# Patient Record
Sex: MALE
[De-identification: names, ages, dates, MRNs, and addresses within clinical notes are randomized per-mention and may not be internally consistent; named-entity substitution may affect disease eponyms.]

---

## 2020-12-29 ENCOUNTER — HOSPITAL ENCOUNTER (EMERGENCY)
Dept: HOSPITAL 39 - ER | Age: 45
Discharge: ANOTHER HOSPITAL | End: 2020-12-29
Payer: MEDICARE

## 2020-12-29 VITALS — SYSTOLIC BLOOD PRESSURE: 188 MMHG | TEMPERATURE: 97.5 F | OXYGEN SATURATION: 97 % | DIASTOLIC BLOOD PRESSURE: 79 MMHG

## 2020-12-29 DIAGNOSIS — Z88.5: ICD-10-CM

## 2020-12-29 DIAGNOSIS — Z86.73: ICD-10-CM

## 2020-12-29 DIAGNOSIS — S02.2XXA: ICD-10-CM

## 2020-12-29 DIAGNOSIS — E11.22: ICD-10-CM

## 2020-12-29 DIAGNOSIS — N18.6: ICD-10-CM

## 2020-12-29 DIAGNOSIS — Z79.899: ICD-10-CM

## 2020-12-29 DIAGNOSIS — I12.0: ICD-10-CM

## 2020-12-29 DIAGNOSIS — H54.62: ICD-10-CM

## 2020-12-29 DIAGNOSIS — Z99.2: ICD-10-CM

## 2020-12-29 DIAGNOSIS — Z79.4: ICD-10-CM

## 2020-12-29 DIAGNOSIS — I51.9: ICD-10-CM

## 2020-12-29 DIAGNOSIS — Z79.82: ICD-10-CM

## 2020-12-29 DIAGNOSIS — S02.831A: ICD-10-CM

## 2020-12-29 DIAGNOSIS — S00.03XA: ICD-10-CM

## 2020-12-29 DIAGNOSIS — S02.31XA: Primary | ICD-10-CM
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
## 2020-12-29 PROCEDURE — 85025 COMPLETE CBC W/AUTO DIFF WBC: CPT

## 2020-12-29 PROCEDURE — 70450 CT HEAD/BRAIN W/O DYE: CPT

## 2020-12-29 PROCEDURE — 36415 COLL VENOUS BLD VENIPUNCTURE: CPT

## 2020-12-29 PROCEDURE — 72125 CT NECK SPINE W/O DYE: CPT

## 2020-12-29 PROCEDURE — 70480 CT ORBIT/EAR/FOSSA W/O DYE: CPT

## 2020-12-29 PROCEDURE — 80048 BASIC METABOLIC PNL TOTAL CA: CPT

## 2020-12-29 NOTE — CT
EXAM DESCRIPTION: Head (accession [id number]), Orbits

(accession [id number])



CLINICAL HISTORY: 45 years Male, fall with facial trauma and

headache



COMPARISON: None.



TECHNIQUE:  Axial images obtained from the skull base to the

vertex without intravenous contrast with images.  Coronal and

sagittal reformations provided.  This exam was performed

according to our departmental dose-optimization program, which

includes automated exposure control, adjustment of the mA and/or

kV according to patient size and/or use of iterative

reconstruction technique.



Time Last Seen Well (If known) for Code Stroke:  n/a



FINDINGS:

Brain Parenchyma, ventricles, meninges, and extra-axial spaces:

Mild generalized cerebral atrophy.  Normal attenuation of brain

parenchyma.  No acute cranial hemorrhage. Serpiginous

hyperdensity in the posterior lateral left occipital lobe that

may represent calcification from previous infection or trauma. 

No abnormal extra-axial fluid collection.



Vascular: Normal.



Calvarium, paranasal sinuses, mastoids, and orbits: Small

posterior scalp hematoma without underlying fracture. Severe

periorbital soft tissue swelling and laceration. Acute depressed

fractures of the medial right orbital wall/lamina papyracea.

Acute minimally depressed right nasal bone fracture. Acute

displaced fracture of the right orbital floor but without

herniation of the inferior rectus muscle. Fluid and blood

products completely opacified the right maxillary sinus. There is

fluid likely blood product within the right ethmoid sinus air

cells as well trace mucosal thickening of the left maxillary

posterior wall. Remaining paranasal sinuses are clear. Small

right mastoid tip effusion. There is mild proptosis of the right

orbit when compared to the left.



IMPRESSION: 

1. No acute intracranial abnormality.

2. Small posterior scalp hematoma without underlying fracture.

3. Severe right periorbital soft tissue swelling laceration with

overlying fractures involving the medial right orbital wall and

orbital floor.

4. Complete opacification from fluid and blood products within

the right maxillary sinus. Blood products also within the right

ethmoid sinus.

5. Mild proptosis of the right orbit when compared to left. There

is also edematous enlargement of the right inferior rectus muscle

which probably relates to trauma. Inferior rectus intramuscular

hematoma is also likely although is without herniation through

the orbital floor fracture.

6. Small right mastoid tip effusion.

7. Minimally depressed right nasal bone fracture.



Electronically signed by:  Nahun Tam MD  12/29/2020 11:36 AM [facility]

Workstation: [id number]

## 2020-12-29 NOTE — CT
EXAM DESCRIPTION: Cervical Spine



CLINICAL HISTORY: 45 years Male, fall with c/o neck pain



COMPARISON: None.



TECHNIQUE: Axial CT images were obtained through the cervical

spine without contrast. Sagittal and coronal reformations

provided.  This exam was performed according to our departmental

dose-optimization program, which includes automated exposure

control, adjustment of the mA and/or kV according to patient size

and/or use of iterative reconstruction technique.



FINDINGS: 

Vertebrae and facet joints:  No acute fracture. No acute

compression deformity. Straightening the cervical spine. Normal

AP alignment. Slight dextro curvature of the cervical spine

without lateral translation and may be positional.  Multilevel

mild facet arthropathy.



Disc spaces, spinal canal and neuroforamina:  This space heights

are maintained.  



No significant disc osteophyte complex. Spinal canal patent.

Multilevel mild neural foraminal stenosis.



Paraspinous soft-tissues: Unremarkable paravertebral soft

tissues. Moderate atherosclerosis.  



IMPRESSION: 

1. No acute fracture or subluxation.



Electronically signed by:  Nahun Tam MD  12/29/2020 11:39 AM [facility]

Workstation: [id number]

## 2020-12-29 NOTE — CT
EXAM DESCRIPTION: Head (accession [id number]), Orbits

(accession [id number])



CLINICAL HISTORY: 45 years Male, fall with facial trauma and

headache



COMPARISON: None.



TECHNIQUE:  Axial images obtained from the skull base to the

vertex without intravenous contrast with images.  Coronal and

sagittal reformations provided.  This exam was performed

according to our departmental dose-optimization program, which

includes automated exposure control, adjustment of the mA and/or

kV according to patient size and/or use of iterative

reconstruction technique.



Time Last Seen Well (If known) for Code Stroke:  n/a



FINDINGS:

Brain Parenchyma, ventricles, meninges, and extra-axial spaces:

Mild generalized cerebral atrophy.  Normal attenuation of brain

parenchyma.  No acute cranial hemorrhage. Serpiginous

hyperdensity in the posterior lateral left occipital lobe that

may represent calcification from previous infection or trauma. 

No abnormal extra-axial fluid collection.



Vascular: Normal.



Calvarium, paranasal sinuses, mastoids, and orbits: Small

posterior scalp hematoma without underlying fracture. Severe

periorbital soft tissue swelling and laceration. Acute depressed

fractures of the medial right orbital wall/lamina papyracea.

Acute minimally depressed right nasal bone fracture. Acute

displaced fracture of the right orbital floor but without

herniation of the inferior rectus muscle. Fluid and blood

products completely opacified the right maxillary sinus. There is

fluid likely blood product within the right ethmoid sinus air

cells as well trace mucosal thickening of the left maxillary

posterior wall. Remaining paranasal sinuses are clear. Small

right mastoid tip effusion. There is mild proptosis of the right

orbit when compared to the left.



IMPRESSION: 

1. No acute intracranial abnormality.

2. Small posterior scalp hematoma without underlying fracture.

3. Severe right periorbital soft tissue swelling laceration with

overlying fractures involving the medial right orbital wall and

orbital floor.

4. Complete opacification from fluid and blood products within

the right maxillary sinus. Blood products also within the right

ethmoid sinus.

5. Mild proptosis of the right orbit when compared to left. There

is also edematous enlargement of the right inferior rectus muscle

which probably relates to trauma. Inferior rectus intramuscular

hematoma is also likely although is without herniation through

the orbital floor fracture.

6. Small right mastoid tip effusion.

7. Minimally depressed right nasal bone fracture.



Electronically signed by:  Nahun Tam MD  12/29/2020 11:36 AM [facility]

Workstation: [id number]

## 2020-12-29 NOTE — ED.PDOC
History of Present Illness





- General


Chief Complaint: Trauma


Stated Complaint: s/p fall,facial swelling


Time Seen by Provider: 12/29/20 09:06


Source: patient, RN notes reviewed, Vital Signs reviewed, EMS notes reviewed


Exam Limitations: no limitations





- History of Present Illness


Initial Comments: 





Patient is a 45-year-old  male who presents from the dialysis unit 

status post trip and fall with complaints of right eye swelling and pain, 

headache and neck pain.  Patient has a past medical history of blindness in the 

left eye and he is only able to see light and dark.  Patient complains of 

swelling to the right periorbital area, headache and neck pain.  The pain is 

throbbing in nature.  It is worse with movement.  Nothing improves the pain.  

The pain is constant.  It does not radiate.  Patient denies any other symptoms.


Occurred: just prior to arrival


Severity: moderate


Pain Location: head, face, neck


Method of Injury: fall


Improving Factors: nothing


Worsening Factors: movement


Loss of Consciousness: no loss of consciousness


Associated Symptoms (Fall): headache, neck pain


Allergies/Adverse Reactions: 


Allergies





Hydrocodone Allergy (Verified 12/29/20 09:32)


   





Home Medications: 


Ambulatory Orders





Amlodipine Besylate 10 mg PO BEDTIME 12/29/20 


Aspirin [Aspirin Adult Low Dose] 81 mg PO DAILY 12/29/20 


Atorvastatin Calcium [Lipitor] 20 mg PO BEDTIME 12/29/20 


Carvedilol 25 mg PO BID 12/29/20 


Gabapentin 300 mg PO BEDTIME 12/29/20 


Human Insulin Aspart [Novolog] 5 unit SC TIDFD 12/29/20 


Insulin Glargine [Basaglar Kwikpen] 25 unit SC .EVENING 12/29/20 


Losartan Potassium 100 mg PO DAILY 12/29/20 


Minoxidil 5 mg PO BID 12/29/20 


Sevelamer Carbonate [Renvela] 1,600 mg PO TID 12/29/20 


Sucroferric Oxyhydroxide [Velphoro] 500 mg PO TID 12/29/20 


Valsartan [Diovan] 320 mg PO DAILY 12/29/20 











Review of Systems





- Review of Systems


Constitutional: States: no symptoms reported, see HPI.  Denies: chills, fever, 

malaise, weakness


EENTM: States: see HPI, eye pain


Respiratory: Denies: cough, short of breath, stridor, wheezing


Cardiology: States: no symptoms reported.  Denies: chest pain, palpitations, 

syncope


Gastrointestinal/Abdominal: States: no symptoms reported.  Denies: abdominal 

pain, diarrhea, nausea, vomiting


Musculoskeletal: States: see HPI, neck pain.  Denies: back pain, joint pain


Skin: States: change in color - right periorbital bruising.  Denies: rash


Neurological: States: see HPI, headache, pre-existing deficit - left eye 

blindness.  Denies: tingling, tremors, weakness


Endocrine: States: no symptoms reported.  Denies: increased hunger, increased 

thirst, increased urine


Hematologic/Lymphatic: States: easy bleeding - Pt is on Plavix.  Denies: blood 

clots


All other Systems: No Change from Baseline





Past Medical History (General)





- Patient Medical History


Hx Stroke: Yes


Hx Cardiac Disorders: Yes


Hx Congestive Heart Failure: No


Hx Hypertension: Yes


Hx Diabetes: Yes


Hx Renal Disease: Yes





- Vaccination History


Hx Influenza Vaccination: Yes


Hx Pneumococcal Vaccination: Yes





- Social History


Hx Tobacco Use: Yes





Family Medical History





- Family History


  ** Father


Family History: Unknown


Living Status: Unknown





Physical Exam





- Physical Exam


General Appearance: Alert, Anxious, Obvious distress, Unkempt, Well Developed, 

Well Nourished


Head Injury: contusions - right periorbital area, ecchymosis - right periorbital

 area, swelling - right periorbital area


Eye Exam: right other - chemosis, sluggish pupilary response, left abnormal 

pupil - pin point without accomadation to light


ENT Exam: hearing grossly normal, no dental injury, other


Neck Exam: limited range of motion - secondary pain, painful range of motion, 

paraspinous muscle tender, spinous processes tender


Cardiovascular/Respiratory: regular rate, rhythm, no M/R/G, normal peripheral 

pulses, no JVD, normal breath sounds, no respiratory distress


Gastrointestinal/Abdominal: normal bowel sounds, non tender, soft, distended - 

obese


Back Exam: normal inspection, no CVA tenderness, no vertebral tenderness


Extremity Exam: no evidence of injury, normal range of motion, non-tender, no 

pedal edema


Neurologic: CNs II-XII nml as tested, no motor/sensory deficits, alert, normal 

mood/affect, oriented x 3


Skin Exam: warm/dry





- [place]ises Coma Score


Best Eye Response (Moises): (4) open spontaneously


Best Verbal Response ([place]): (5) oriented


Best Motor Response (Moises): (6) obeys commands





Progress





- Progress


Progress: 


Differential diagnosis: Orbital wall fracture, cranial fracture, intracranial 

bleed, retrobulbar hematoma among others.








12/29/20 11:30


Still awaiting reading for the CT scans.  Remainder of the labs are unremarkable

 except for the renal failure which is expected as this is a chronic dialysis 

patient.  Patient's pain is improved after pain medications.








12/29/20 12:15


CT scan results reviewed with the patient and his wife.  Concern for continued 

bleeding retro and inferior ocularly.  Have recommended transfer to the trauma 

center for further evaluation.  Patient was accepted by Dr. Glynn at [facility] trauma

 services.  I discussed the plan of care with the patient and his wife and they 

voiced understanding and agreement with the plan of care at this time.  We are 

arranging transport now.





Peter Westbrook m.D.


#[phone]

















- Results/Orders


Results/Orders: 





                                        





12/29/20 09:06


Elevate:Head .PRN 








                         Laboratory Results - last 24 hr











  12/29/20 12/29/20





  09:16 09:16


 


WBC  6.0 


 


RBC  2.90 L 


 


Hgb  8.8 L 


 


Hct  26.4 L 


 


MCV  90.9 


 


MCH  30.5 


 


MCHC  33.5 


 


RDW  16.9 H 


 


Plt Count  82 L 


 


MPV  9.5 


 


Absolute Neuts (auto)  4.40 


 


Absolute Lymphs (auto)  0.90 L 


 


Absolute Monos (auto)  0.40 


 


Absolute Eos (auto)  0.30 


 


Absolute Basos (auto)  0.00 


 


Neutrophils %  73.6 


 


Lymphocytes %  14.9 L 


 


Monocytes %  6.1 


 


Eosinophils %  4.7 


 


Basophils %  0.7 


 


Sodium   136


 


Potassium   3.5 L


 


Chloride   92 L


 


Carbon Dioxide   30


 


Anion Gap   17.5


 


BUN   33 H


 


Creatinine   5.89 H


 


BUN/Creatinine Ratio   5.6 L


 


Random Glucose   104


 


Serum Osmolality   279.5


 


Calcium   8.6








EXAM DESCRIPTION: Head (accession [id number]), Orbits (accession 

[id number])  CLINICAL HISTORY: 45 years Male, fall with facial trauma and 

headache  COMPARISON: None.  TECHNIQUE: Axial images obtained from the skull 

base to the vertex without intravenous contrast with images. Coronal and 

sagittal reformations provided. This exam was performed according to our 

departmental dose-optimization program, which includes automated exposure 

control, adjustment of the mA and/or kV according to patient size and/or use of 

iterative reconstruction technique.  Time Last Seen Well (If known) for Code 

Stroke: n/a  FINDINGS: Brain Parenchyma, ventricles, meninges, and extra-axial 

spaces: Mild generalized cerebral atrophy. Normal attenuation of brain 

parenchyma. No acute cranial hemorrhage. Serpiginous hyperdensity in the 

posterior lateral left occipital lobe that may represent calcification from 

previous infection or trauma. No abnormal extra-axial fluid collection.  

Vascular: Normal.  Calvarium, paranasal sinuses, mastoids, and orbits: Small 

posterior scalp hematoma without underlying fracture. Severe periorbital soft 

tissue swelling and laceration. Acute depressed fractures of the medial right 

orbital wall/lamina papyracea. Acute minimally depressed right nasal bone 

fracture. Acute displaced fracture of the right orbital floor but without 

herniation of the inferior rectus muscle. Fluid and blood products completely 

opacified the right maxillary sinus. There is fluid likely blood product within 

the right ethmoid sinus air cells as well trace mucosal thickening of the left 

maxillary posterior wall. Remaining paranasal sinuses are clear. Small right 

mastoid tip effusion. There is mild proptosis of the right orbit when compared 

to the left.  IMPRESSION: 1. No acute intracranial abnormality. 2. Small 

posterior scalp hematoma without underlying fracture. 3. Severe right 

periorbital soft tissue swelling laceration with overlying fractures involving 

the medial right orbital wall and orbital floor. 4. Complete opacification from 

fluid and blood products within the right maxillary sinus. Blood products also 

within the right ethmoid sinus. 5. Mild proptosis of the right orbit when 

compared to left. There is also edematous enlargement of the right inferior 

rectus muscle which probably relates to trauma. Inferior rectus intramuscular 

hematoma is also likely although is without herniation through the orbital floor

 fracture. 6. Small right mastoid tip effusion. 7. Minimally depressed right 

nasal bone fracture.  Electronically signed by: Nahun Tam MD 12/29/2020 11:36 

AM CST





EXAM DESCRIPTION: Cervical Spine  CLINICAL HISTORY: 45 years Male, fall with c/o

 neck pain  COMPARISON: None.  TECHNIQUE: Axial CT images were obtained through 

the cervical spine without contrast. Sagittal and coronal reformations provided.

 This exam was performed according to our departmental dose-optimization 

program, which includes automated exposure control, adjustment of the mA and/or 

kV according to patient size and/or use of iterative reconstruction technique.  

FINDINGS: Vertebrae and facet joints: No acute fracture. No acute compression 

deformity. Straightening the cervical spine. Normal AP alignment. Slight dextro 

curvature of the cervical spine without lateral translation and may be 

positional. Multilevel mild facet arthropathy.  Disc spaces, spinal canal and 

neuroforamina: This space heights are maintained.  No significant disc 

osteophyte complex. Spinal canal patent. Multilevel mild neural foraminal 

stenosis.  Paraspinous soft-tissues: Unremarkable paravertebral soft tissues. 

Moderate atherosclerosis.  IMPRESSION: 1. No acute fracture or subluxation.  

Electronically signed by: Nahun Tam MD 12/29/2020 11:39 AM CST





                                   Vital Signs











  12/29/20 12/29/20 12/29/20





  09:03 10:14 11:00


 


Temperature 97.9 F  


 


Pulse Rate [ 69 67 65





Left Brachial]   


 


Respiratory 20 20 18





Rate   


 


Blood Pressure 159/65 169/83 148/65





[Left Arm]   


 


O2 Sat by Pulse 100 93 L 89 L





Oximetry   














Departure





- Departure


Clinical Impression: 


 Ocular proptosis





Fall


Qualifiers:


 Encounter type: initial encounter Qualified Code(s): W19.XXXA - Unspecified 

fall, initial encounter





Orbital wall fracture


Qualifiers:


 Encounter type: initial encounter Fracture type: closed Qualified Code(s): 

S02.80XA - Fracture of other specified skull and facial bones, unspecified side,

initial encounter for closed fracture





Traumatic contusion of right periorbital region


Qualifiers:


 Encounter type: initial encounter Qualified Code(s): S05.11XA - Contusion of 

eyeball and orbital tissues, right eye, initial encounter





Time of Disposition: 12:18


Disposition: Transfer to Hospital


Condition: Fair


Departure Forms:  ED Discharge - Pt. Copy, Patient Portal Self Enrollment


Instructions:  DI for Trauma


Activity: ambulate only with walker, as per physical therapy


Referrals: 


MARTIN MAX [Primary Care Provider] - 1-2 Weeks


Home Medications: 


Ambulatory Orders





Amlodipine Besylate 10 mg PO BEDTIME 12/29/20 


Aspirin [Aspirin Adult Low Dose] 81 mg PO DAILY 12/29/20 


Atorvastatin Calcium [Lipitor] 20 mg PO BEDTIME 12/29/20 


Carvedilol 25 mg PO BID 12/29/20 


Gabapentin 300 mg PO BEDTIME 12/29/20 


Human Insulin Aspart [Novolog] 5 unit SC TIDFD 12/29/20 


Insulin Glargine [Basaglar Kwikpen] 25 unit SC .EVENING 12/29/20 


Losartan Potassium 100 mg PO DAILY 12/29/20 


Minoxidil 5 mg PO BID 12/29/20 


Sevelamer Carbonate [Renvela] 1,600 mg PO TID 12/29/20 


Sucroferric Oxyhydroxide [Velphoro] 500 mg PO TID 12/29/20 


Valsartan [Diovan] 320 mg PO DAILY 12/29/20 











Transfer to Outside Facility





- Transfer Information


Decision to Transfer Date: 12/29/20


Decision to Transfer Time: 12:10


Reason for Transfer: required specialist not available


Accepting Provider:: Dr. Glynn


Accepting Facility: [facility]